# Patient Record
Sex: FEMALE | Race: WHITE | Employment: FULL TIME | URBAN - METROPOLITAN AREA
[De-identification: names, ages, dates, MRNs, and addresses within clinical notes are randomized per-mention and may not be internally consistent; named-entity substitution may affect disease eponyms.]

---

## 2017-08-04 ENCOUNTER — OFFICE VISIT (OUTPATIENT)
Dept: URGENT CARE | Facility: URGENT CARE | Age: 52
End: 2017-08-04
Payer: COMMERCIAL

## 2017-08-04 VITALS
HEIGHT: 62 IN | RESPIRATION RATE: 14 BRPM | OXYGEN SATURATION: 96 % | TEMPERATURE: 97.7 F | BODY MASS INDEX: 34.96 KG/M2 | SYSTOLIC BLOOD PRESSURE: 118 MMHG | HEART RATE: 57 BPM | WEIGHT: 190 LBS | DIASTOLIC BLOOD PRESSURE: 72 MMHG

## 2017-08-04 DIAGNOSIS — B07.9 SUBUNGUAL WART: ICD-10-CM

## 2017-08-04 DIAGNOSIS — Z23 NEED FOR VACCINATION: ICD-10-CM

## 2017-08-04 DIAGNOSIS — L03.011 PARONYCHIA OF RIGHT RING FINGER: Primary | ICD-10-CM

## 2017-08-04 PROCEDURE — 87186 SC STD MICRODIL/AGAR DIL: CPT | Performed by: PHYSICIAN ASSISTANT

## 2017-08-04 PROCEDURE — 87070 CULTURE OTHR SPECIMN AEROBIC: CPT | Performed by: PHYSICIAN ASSISTANT

## 2017-08-04 PROCEDURE — 90715 TDAP VACCINE 7 YRS/> IM: CPT | Performed by: PHYSICIAN ASSISTANT

## 2017-08-04 PROCEDURE — 90471 IMMUNIZATION ADMIN: CPT | Performed by: PHYSICIAN ASSISTANT

## 2017-08-04 PROCEDURE — 99203 OFFICE O/P NEW LOW 30 MIN: CPT | Mod: 25 | Performed by: PHYSICIAN ASSISTANT

## 2017-08-04 PROCEDURE — 87077 CULTURE AEROBIC IDENTIFY: CPT | Performed by: PHYSICIAN ASSISTANT

## 2017-08-04 RX ORDER — TRAMADOL HYDROCHLORIDE 50 MG/1
50 TABLET ORAL EVERY 6 HOURS PRN
Qty: 12 TABLET | Refills: 0 | Status: SHIPPED | OUTPATIENT
Start: 2017-08-04 | End: 2017-08-07

## 2017-08-04 RX ORDER — CEPHALEXIN 500 MG/1
500 CAPSULE ORAL 4 TIMES DAILY
Qty: 40 CAPSULE | Refills: 0 | Status: SHIPPED | OUTPATIENT
Start: 2017-08-04

## 2017-08-04 NOTE — MR AVS SNAPSHOT
After Visit Summary   8/4/2017    Yuko Giron    MRN: [id number]           Patient Information     Date Of Birth          1965        Visit Information        Provider Department      8/4/2017 8:35 PM Titi Cuellar PA-C [facility]        Today's Diagnoses     Paronychia of right ring finger    -  1    Subungual wart        Need for vaccination          Care Instructions      Paronychia of the Finger or Toe  Paronychia is an infection near a fingernail or toenail. It usually occurs when an opening in the cuticle or an ingrown toenail lets bacteria under the skin.  The infection will need to be drained if pus is present. If the infection has been caught early, you may need only antibiotic treatment. Healing will take about 1 to 2 weeks.  Home care  Follow these guidelines when caring for yourself at home:    Clean and soak the toe or finger. Do this 2 times a day for the first 3 days. To do so:    Soak your foot or hand in a tub of warm water for 5 minutes. Or hold your toe or finger under a faucet of warm running water for 5 minutes.    Clean any crust away with soap and water using a cotton swab.    Put antibiotic ointment on the infected area.    Change the dressing daily or any time it gets dirty.    If you were given antibiotics, take them as directed until they are all gone.    If your infection is on a toe, wear comfortable shoes with a lot of toe room. You can also wear open-toed sandals while your toe heals.    You may use over-the-counter medicine (acetaminophen or ibuprofen to help with pain, unless another medicine was prescribed. If you have chronic liver or kidney disease, talk with your healthcare provider before using these medicines. Also talk with your provider if you've had a stomach ulcer or GI (gastrointestinal) bleeding.  Prevention  The following can prevent paronychia:    Avoid cutting or playing with your cuticles at home.    Don't bite your  "nails.    Don't suck on your thumbs or fingers.  Follow-up care  Follow up with your healthcare provider, or as advised.  When to seek medical advice  Call your healthcare provider right away if any of these occur:    Redness, pain, or swelling of the finger or toe gets worse    Red streaks in the skin leading away from the wound    Pus or fluid draining from the nail area    Fever of 100.4 F (38 C) or higher, or as directed by your provider  Date Last Reviewed: 8/1/2016 2000-2017 [employer] [address] [place] [address]. All rights reserved. This information is not intended as a substitute for professional medical care. Always follow your healthcare professional's instructions.                Follow-ups after your visit        Who to contact     If you have questions or need follow up information about today's clinic visit or your schedule please contact [facility] directly at [phone].  Normal or non-critical lab and imaging results will be communicated to you by SuccessTSMhart, letter or phone within 4 business days after the clinic has received the results. If you do not hear from us within 7 days, please contact the clinic through SuccessTSMhart or phone. If you have a critical or abnormal lab result, we will notify you by phone as soon as possible.  Submit refill requests through Planet8 or call your pharmacy and they will forward the refill request to us. Please allow 3 business days for your refill to be completed.          Additional Information About Your Visit        Planet8 Information     Planet8 lets you send messages to your doctor, view your test results, renew your prescriptions, schedule appointments and more. To sign up, go to [email or web address] . Click on \"Log in\" on the left side of the screen, which will take you to the Welcome page. Then click on \"Sign up Now\" on the right side of the page.     You will be asked to enter the access code listed " "below, as well as some personal information. Please follow the directions to create your username and password.     Your access code is: [email or web address]  Expires: 2017  9:16 PM     Your access code will  in 90 days. If you need help or a new code, please call your [facility] clinic or [phone].        Care EveryWhere ID     This is your Care EveryWhere ID. This could be used by other organizations to access your [facility] medical records  [id number]        Your Vitals Were     Pulse Temperature Respirations Height Last Period Pulse Oximetry    57 97.7  F (36.5  C) (Oral) 14 5' 2\" (1.575 m) 2011 96%    BMI (Body Mass Index)                   34.75 kg/m2            Blood Pressure from Last 3 Encounters:   17 118/72   11 160/94    Weight from Last 3 Encounters:   17 190 lb (86.2 kg)   11 204 lb 9.6 oz (92.8 kg)              We Performed the Following     ADMIN 1st VACCINE     TDAP VACCINE (ADACEL)     Wound Culture Aerobic Bacterial          Today's Medication Changes          These changes are accurate as of: 17  9:16 PM.  If you have any questions, ask your nurse or doctor.               Start taking these medicines.        Dose/Directions    cephALEXin 500 MG capsule   Commonly known as:  KEFLEX   Used for:  Paronychia of right ring finger   Started by:  Titi Cuellar PA-C        Dose:  500 mg   Take 1 capsule (500 mg) by mouth 4 times daily   Quantity:  40 capsule   Refills:  0       traMADol 50 MG tablet   Commonly known as:  ULTRAM   Used for:  Paronychia of right ring finger   Started by:  Titi Cuellar PA-C        Dose:  50 mg   Take 1 tablet (50 mg) by mouth every 6 hours as needed for moderate pain   Quantity:  12 tablet   Refills:  0         Stop taking these medicines if you haven't already. Please contact your care team if you have questions.     hydrochlorothiazide 12.5 MG capsule   Commonly known as:  MICROZIDE   Stopped by:  Titi Cuellar PA-C           " simvastatin 20 MG tablet   Commonly known as:  ZOCOR   Stopped by:  Titi Cuellar PA-C                Where to get your medicines      These medications were sent to [facility]/pharmacy #[id number] - [place], [place] - [address] AT [facility]  [address] [place] [address]    Hours:  24-hours Phone:  [phone]     cephALEXin 500 MG capsule         Some of these will need a paper prescription and others can be bought over the counter.  Ask your nurse if you have questions.     Bring a paper prescription for each of these medications     traMADol 50 MG tablet                Primary Care Provider Office Phone # Fax #    Cristian Sampson [phone]       [employer] [facility] [address] [facility] [address]        Equal Access to Services     TONE SWEENEY AH: Lucio coronel hadasho Soomaali, waaxda luqadaha, qaybta kaalmada adeegyada, mary tilley . So [facility] [phone].    ATENCIÓN: Si habla español, tiene a morales disposición servicios gratuitos de asistencia lingüística. [facility] [phone].    We comply with applicable federal civil rights laws and [place] laws. We do not discriminate on the basis of race, color, national origin, age, disability sex, sexual orientation or gender identity.            Thank you!     Thank you for choosing [facility]  for your care. Our goal is always to provide you with excellent care. Hearing back from our patients is one way we can continue to improve our services. Please take a few minutes to complete the written survey that you may receive in the mail after your visit with us. Thank you!             Your Updated Medication List - Protect others around you: Learn how to safely use, store and throw away your medicines at www.disposemymeds.org.          This list is accurate as of: 8/4/17  9:16 PM.  Always use your most recent med list.                   Brand Name Dispense Instructions  for use Diagnosis    cephALEXin 500 MG capsule    KEFLEX    40 capsule    Take 1 capsule (500 mg) by mouth 4 times daily    Paronychia of right ring finger       co-enzyme Q-10 100 MG Caps capsule      Take  by mouth daily.        DAILY MULTIVITAMIN PO      Take  by mouth daily.        KEPPRA  MG 24 hr tablet   Generic drug:  levETIRAcetam      Take 500 mg by mouth 2 times daily.        LIPITOR PO           LOSARTAN POTASSIUM PO           metoprolol 25 MG 24 hr tablet    TOPROL-XL     Take 50 mg by mouth daily        SPIRONOLACTONE PO           traMADol 50 MG tablet    ULTRAM    12 tablet    Take 1 tablet (50 mg) by mouth every 6 hours as needed for moderate pain    Paronychia of right ring finger       VERAPAMIL HCL PO           vitamin D 1000 UNITS capsule      Take 1 capsule by mouth daily.

## 2017-08-05 NOTE — NURSING NOTE
"Chief Complaint   Patient presents with     Urgent Care     Derm Problem     infected right 4th finger on right hand, started last night. Filed her nails last night which she thinks caused it.        Initial /72  Pulse 57  Temp 97.7  F (36.5  C) (Oral)  Resp 14  Ht 5' 2\" (1.575 m)  Wt 190 lb (86.2 kg)  LMP 07/05/2011  SpO2 96%  BMI 34.75 kg/m2 Estimated body mass index is 34.75 kg/(m^2) as calculated from the following:    Height as of this encounter: 5' 2\" (1.575 m).    Weight as of this encounter: 190 lb (86.2 kg).  Medication Reconciliation: complete  "

## 2017-08-05 NOTE — PROGRESS NOTES
SUBJECTIVE:  Chief Complaint   Patient presents with     Urgent Care     Derm Problem     infected right 4th finger on right hand, started last night. Filed her nails last night which she thinks caused it.      Yuko Giron is a 52 year old female who presents with a chief complaint of right right finger pain, swelling and tenderness.  Symptoms began 2 day(s) ago, are moderately severe and sudden onset, still present and constant  Context:  Injury:Yes: Patient was using a finger nail file to remove a wart on the ulnar side of the nail.  Injury happened while at home and while trying to reduce a wart with a file. How: filing the area with a file immediate pain, delayed swelling.   Pain exacerbated by holding hand in dependent position Relieved by elevation.  She treated it initially with no therapy. This is the first time this type of injury has occurred to this patient.     No past medical history on file.  Current Outpatient Prescriptions   Medication Sig Dispense Refill     Atorvastatin Calcium (LIPITOR PO)        LOSARTAN POTASSIUM PO        SPIRONOLACTONE PO        VERAPAMIL HCL PO        cephALEXin (KEFLEX) 500 MG capsule Take 1 capsule (500 mg) by mouth 4 times daily 40 capsule 0     traMADol (ULTRAM) 50 MG tablet Take 1 tablet (50 mg) by mouth every 6 hours as needed for moderate pain 12 tablet 0     co-enzyme Q-10 (CO Q 10) 100 MG CAPS Take  by mouth daily.       levetiracetam (KEPPRA XR) 500 MG 24 hr tablet Take 500 mg by mouth 2 times daily.       metoprolol (TOPROL-XL) 25 MG 24 hr tablet Take 50 mg by mouth daily        Multiple Vitamin (DAILY MULTIVITAMIN PO) Take  by mouth daily.       Cholecalciferol (VITAMIN D) 1000 UNIT capsule Take 1 capsule by mouth daily.       Social History   Substance Use Topics     Smoking status: Former Smoker     Smokeless tobacco: Never Used     Alcohol use Not on file       ROS:  Review of systems negative except as stated below    EXAM:   /72  Pulse 57  Temp  "97.7  F (36.5  C) (Oral)  Resp 14  Ht 5' 2\" (1.575 m)  Wt 190 lb (86.2 kg)  LMP 07/05/2011  SpO2 96%  BMI 34.75 kg/m2  M/S Exam: right hand ring finger on ulnar side has discharge from the ulnar side a culture is taken  erythema, swelling, tenderness to palpation and at the ulnar side is consistent with a paronychia  There is a subungual wart  The volar finger pad is swollen and erythematous and may be an early felon.  It is not amenable to ID for a felon at this time  GENERAL APPEARANCE: healthy, alert and no distress  EXTREMITIES: peripheral pulses normal  SKIN: no suspicious lesions or rashes  NEURO: Normal strength and tone, sensory exam grossly normal, mentation intact and speech normal    X-RAY was not done.    ASSESSMENT:     Encounter Diagnoses   Name Primary?     Need for vaccination Yes     Paronychia of right ring finger        PLAN:  Warm soaks on finger  Keep clean dry and protected  Antibiotic as written  Follow up with hand surgeon if not improved in 48 hours  Indication for emergent return to ER gone over.  Follow up with dermatology for definitive treatment of the subungual wart    1. Paronychia of right ring finger    - cephALEXin (KEFLEX) 500 MG capsule; Take 1 capsule (500 mg) by mouth 4 times daily  Dispense: 40 capsule; Refill: 0  - traMADol (ULTRAM) 50 MG tablet; Take 1 tablet (50 mg) by mouth every 6 hours as needed for moderate pain  Dispense: 12 tablet; Refill: 0  - Wound Culture Aerobic Bacterial    - TDAP VACCINE (ADACEL)  - ADMIN 1st VACCINE      "

## 2017-08-05 NOTE — PATIENT INSTRUCTIONS
Paronychia of the Finger or Toe  Paronychia is an infection near a fingernail or toenail. It usually occurs when an opening in the cuticle or an ingrown toenail lets bacteria under the skin.  The infection will need to be drained if pus is present. If the infection has been caught early, you may need only antibiotic treatment. Healing will take about 1 to 2 weeks.  Home care  Follow these guidelines when caring for yourself at home:    Clean and soak the toe or finger. Do this 2 times a day for the first 3 days. To do so:    Soak your foot or hand in a tub of warm water for 5 minutes. Or hold your toe or finger under a faucet of warm running water for 5 minutes.    Clean any crust away with soap and water using a cotton swab.    Put antibiotic ointment on the infected area.    Change the dressing daily or any time it gets dirty.    If you were given antibiotics, take them as directed until they are all gone.    If your infection is on a toe, wear comfortable shoes with a lot of toe room. You can also wear open-toed sandals while your toe heals.    You may use over-the-counter medicine (acetaminophen or ibuprofen to help with pain, unless another medicine was prescribed. If you have chronic liver or kidney disease, talk with your healthcare provider before using these medicines. Also talk with your provider if you've had a stomach ulcer or GI (gastrointestinal) bleeding.  Prevention  The following can prevent paronychia:    Avoid cutting or playing with your cuticles at home.    Don't bite your nails.    Don't suck on your thumbs or fingers.  Follow-up care  Follow up with your healthcare provider, or as advised.  When to seek medical advice  Call your healthcare provider right away if any of these occur:    Redness, pain, or swelling of the finger or toe gets worse    Red streaks in the skin leading away from the wound    Pus or fluid draining from the nail area    Fever of 100.4 F (38 C) or higher, or as directed  by your provider  Date Last Reviewed: 8/1/2016 2000-2017 [employer]. [address] [place] [address], [place] [address]. All rights reserved. This information is not intended as a substitute for professional medical care. Always follow your healthcare professional's instructions.

## 2017-08-08 LAB
BACTERIA SPEC CULT: ABNORMAL
MICRO REPORT STATUS: ABNORMAL
MICROORGANISM SPEC CULT: ABNORMAL
SPECIMEN SOURCE: ABNORMAL

## 2021-10-22 NOTE — PROGRESS NOTES
[facility] Initial Evaluation    Subjective:  Yuko Giron is a 56 year old female with a cervical condition. Mechanism of injury: Chronic intermittent left neck and shoulder since 2016. Most recent onset starting about 3 months ago for unknown reasons. Where: (home, work, MVA, community, recreation/sport, unknown, other): NA. Onset of symptoms: July 2021, PT order date: October 2021. Location of symptoms: left neck, let upper trap, left shoulder blade, thoracic spine, left shoulder, left upper arm, left forearm, left digits 1-3. Pain level on number scale: 4-7/10. Quality of pain: compression, tightness. Associated symptoms: numbness, tingling. Pain frequency (constant/intermittent): constant. Symptoms are exacerbated by: prolonged standing, prolonged sitting, sleeping, computer work, driving, cooking. Symptoms are relieved by: heat. Progression of symptoms since onset (same/better/worse): worse. Special tests (x-ray, MRI, CT scan, EMG, bone scan): none recently. Previous treatment: None. Improvement with previous treatment: NA. General health as reported by patient is fair. Pertinent medical history includes:  see Epic. Medical allergies includes: see Epic. Surgical history includes: see Epic. Current medications include: see Epic. 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 Patient is (working in normal job without restrictions/working in normal job with restrictions/working in an alternate job/not working due to present treatment problem): working in normal job. Primary job tasks: computer work, prolonged sitting. Barriers at home/work: None reported by patient. Red flags: None reported by patient.    Objective  Posture     Sitting (good/fair/poor):  poor  Standing (good/fair/poor):  fair  Protruded head (yes/no):  no  Wry neck (right/left/nil):  nil  Relevant wry neck (yes/no):  no  Correction of posture (better/worse/no effect): better    Neurological    Myotomes L R   C4  (shoulder elevation) 5/5 5/5   C5 (shoulder abduction) 5/5 5/5   C6 (elbow flexion) 5/5 5/5   C7 (elbow extension) 5/5 5/5   C8 (thumb extension) 5/5 5/5   T1 (finger add/abd) 5/5 5/5     Sensory Deficit, Reflexes, Dural Signs: cervical dermatomes WNL    Cervical Movement Loss Matt Mod Min Nil Pain   Protrusion    x +   Flexion   x  +   Retraction   x  +   Extension   x  +   Rotation Left   x  +   Rotation Right   x  +   Lateral Flexion Right   x  +   Lateral Flexion Left   x  +     Assessment/Plan:    Patient is a 56 year old female with cervical complaints.    Patient has the following significant findings with corresponding treatment plan.                Diagnosis 1:  Neck pain with left radiculopathy  Pain -  hot/cold therapy, manual therapy, self management, education, directional preference exercise and home program  Decreased ROM/flexibility - manual therapy and therapeutic exercise  Decreased joint mobility - manual therapy and therapeutic exercise  Decreased strength - therapeutic exercise and therapeutic activities  Impaired muscle performance - neuro re-education and home program  Decreased function - therapeutic activities  Impaired posture - neuro re-education    Previous and current functional limitations:  (See Goal Flow Sheet for this information)    Short term and Long term goals: (See Goal Flow Sheet for this information)     Communication ability:  Patient appears to be able to clearly communicate and understand verbal and written communication and follow directions correctly.  Treatment Explanation - The following has been discussed with the patient:   RX ordered/plan of care  Anticipated outcomes  Possible risks and side effects  This patient would benefit from PT intervention to resume normal activities.   Rehab potential is good.    Frequency:  1 X week, once daily  Duration:  for 4 weeks tapering to 2 X a month over 1 month  Discharge Plan:  Achieve all LTG.  Independent in home treatment  program.  Reach maximal therapeutic benefit.    Please refer to the daily flowsheet for treatment today, total treatment time and time spent performing 1:1 timed codes.

## 2021-10-25 ENCOUNTER — THERAPY VISIT (OUTPATIENT)
Dept: PHYSICAL THERAPY | Facility: CLINIC | Age: 56
End: 2021-10-25
Payer: COMMERCIAL

## 2021-10-25 DIAGNOSIS — M54.2 NECK PAIN: ICD-10-CM

## 2021-10-25 PROCEDURE — 97530 THERAPEUTIC ACTIVITIES: CPT | Mod: GP | Performed by: PHYSICAL THERAPIST

## 2021-10-25 PROCEDURE — 97110 THERAPEUTIC EXERCISES: CPT | Mod: GP | Performed by: PHYSICAL THERAPIST

## 2021-10-25 PROCEDURE — 97161 PT EVAL LOW COMPLEX 20 MIN: CPT | Mod: GP | Performed by: PHYSICAL THERAPIST

## 2021-10-25 NOTE — LETTER
[facility]  [address] [place] [address]  [phone]    2021    Re: Yuko Giron   :   1965  MRN:  [id number]   REFERRING PHYSICIAN:   Maki [facility]  Date of Initial Evaluation:  10/25/21  Visits:  Rxs Used: 1  Reason for Referral:  Neck pain    EVALUATION SUMMARY    [facility] Initial Evaluation    Subjective:  Yuko Giron is a 56 year old female with a cervical condition. Mechanism of injury: Chronic intermittent left neck and shoulder since 2016. Most recent onset starting about 3 months ago for unknown reasons. Where: (home, work, MVA, community, recreation/sport, unknown, other): NA. Onset of symptoms: 2021, PT order date: 2021. Location of symptoms: left neck, let upper trap, left shoulder blade, thoracic spine, left shoulder, left upper arm, left forearm, left digits 1-3. Pain level on number scale: 4-7/10. Quality of pain: compression, tightness. Associated symptoms: numbness, tingling. Pain frequency (constant/intermittent): constant. Symptoms are exacerbated by: prolonged standing, prolonged sitting, sleeping, computer work, driving, cooking. Symptoms are relieved by: heat. Progression of symptoms since onset (same/better/worse): worse. Special tests (x-ray, MRI, CT scan, EMG, bone scan): none recently. Previous treatment: None. Improvement with previous treatment: NA. General health as reported by patient is fair. Pertinent medical history includes:  see Epic. Medical allergies includes: see Epic. Surgical history includes: see Epic. Current medications include: see Epic. 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 Patient is (working in normal job without restrictions/working in normal job with restrictions/working in an alternate job/not working due to present treatment problem): working in normal job. Primary job  tasks: computer work, prolonged sitting. Barriers at home/work: None reported by patient. Red flags: None reported by patient.    Objective  Posture     Sitting (good/fair/poor):  poor  Standing (good/fair/poor):  fair  Protruded head (yes/no):  no  Wry neck (right/left/nil):  nil  Relevant wry neck (yes/no):  no  Correction of posture (better/worse/no effect): better    Neurological    Re: Yuko L Dekristieirst   :   1965    Myotomes L R   C4 (shoulder elevation)    C5 (shoulder abduction)    C6 (elbow flexion)    C7 (elbow extension)    C8 (thumb extension)    T1 (finger add/abd)      Sensory Deficit, Reflexes, Dural Signs: cervical dermatomes WNL    Cervical Movement Loss Matt Mod Min Nil Pain   Protrusion    x +   Flexion   x  +   Retraction   x  +   Extension   x  +   Rotation Left   x  +   Rotation Right   x  +   Lateral Flexion Right   x  +   Lateral Flexion Left   x  +     Assessment/Plan:    Patient is a 56 year old female with cervical complaints.    Patient has the following significant findings with corresponding treatment plan.                Diagnosis 1:  Neck pain with left radiculopathy  Pain -  hot/cold therapy, manual therapy, self management, education, directional preference exercise and home program  Decreased ROM/flexibility - manual therapy and therapeutic exercise  Decreased joint mobility - manual therapy and therapeutic exercise  Decreased strength - therapeutic exercise and therapeutic activities  Impaired muscle performance - neuro re-education and home program  Decreased function - therapeutic activities  Impaired posture - neuro re-education    Previous and current functional limitations:  (See Goal Flow Sheet for this information)    Short term and Long term goals: (See Goal Flow Sheet for this information)     Communication ability:  Patient appears to be able to clearly communicate and understand verbal and written communication and follow  directions correctly.  Treatment Explanation - The following has been discussed with the patient:   RX ordered/plan of care  Anticipated outcomes  Possible risks and side effects  This patient would benefit from PT intervention to resume normal activities.   Rehab potential is good.    Frequency:  1 X week, once daily  Duration:  for 4 weeks tapering to 2 X a month over 1 month  Discharge Plan:  Achieve all LTG.  Independent in home treatment program.  Reach maximal therapeutic benefit.  Re: Yuko Giron   :   1965    Thank you for your referral.    INQUIRIES  Therapist: Rizwan Nguyen DPT, Cert. MDT  [facility] [address] [place] [address]  Phone: [phone]  Fax: [phone]

## 2021-11-01 ENCOUNTER — THERAPY VISIT (OUTPATIENT)
Dept: PHYSICAL THERAPY | Facility: CLINIC | Age: 56
End: 2021-11-01
Payer: COMMERCIAL

## 2021-11-01 DIAGNOSIS — M54.2 NECK PAIN: ICD-10-CM

## 2021-11-01 PROCEDURE — 97530 THERAPEUTIC ACTIVITIES: CPT | Mod: GP | Performed by: PHYSICAL THERAPIST

## 2021-11-01 PROCEDURE — 97112 NEUROMUSCULAR REEDUCATION: CPT | Mod: GP | Performed by: PHYSICAL THERAPIST

## 2021-11-01 PROCEDURE — 97110 THERAPEUTIC EXERCISES: CPT | Mod: GP | Performed by: PHYSICAL THERAPIST

## 2021-11-08 ENCOUNTER — THERAPY VISIT (OUTPATIENT)
Dept: PHYSICAL THERAPY | Facility: CLINIC | Age: 56
End: 2021-11-08
Payer: COMMERCIAL

## 2021-11-08 DIAGNOSIS — M54.2 NECK PAIN: ICD-10-CM

## 2021-11-08 PROCEDURE — 97110 THERAPEUTIC EXERCISES: CPT | Mod: GP | Performed by: PHYSICAL THERAPIST

## 2021-11-08 PROCEDURE — 97140 MANUAL THERAPY 1/> REGIONS: CPT | Mod: GP | Performed by: PHYSICAL THERAPIST

## 2021-11-08 PROCEDURE — 97112 NEUROMUSCULAR REEDUCATION: CPT | Mod: GP | Performed by: PHYSICAL THERAPIST

## 2021-12-31 PROBLEM — M54.2 NECK PAIN: Status: RESOLVED | Noted: 2021-10-25 | Resolved: 2021-12-31

## 2021-12-31 NOTE — PROGRESS NOTES
Discharge Note    Progress reporting period is from initial evaluation date (please see noted date below) to Nov 8, 2021.  Linked Episodes   Type: Episode: Status: Noted: Resolved: Last update: Updated by:   PHYSICAL THERAPY neck and left shoulder Active 10/25/2021  11/8/2021  5:19 PM Rizwan Nguyen PT      Comments:       Yuko failed to follow up and current status is unknown.  Please see information below for last relevant information on current status.  Patient seen for 3 visits.    SUBJECTIVE  Subjective changes noted by patient:  Patient reports she has not been as good with exercises this past week but has been paying more attention to posture. Patient states most of her pain is located in lower central cervical spine and left upper trap region. Not much pain down left UE into forearm and hand.  .  Current pain level is 1/10.     Previous pain level was  7/10.   Changes in function:  Yes (See Goal flowsheet attached for changes in current functional level)  Adverse reaction to treatment or activity: None    OBJECTIVE  Changes noted in objective findings: cervical AROM rotation bilaterally = min loss - improved right rotation WNL after thoracic EXT stretch; weak scapular stabilizers; hypomobile thoracic spine     ASSESSMENT/PLAN  Diagnosis: Neck pain with left radiculopathy   Updated problem list and treatment plan:     STG/LTGs have been met or progress has been made towards goals:  Yes, please see goal flowsheet for most current information  Assessment of Progress: current status is unknown.    Last current status: Pt is progressing as expected   Self Management Plans:  HEP  I have re-evaluated this patient and find that the nature, scope, duration and intensity of the therapy is appropriate for the medical condition of the patient.  Yuko continues to require the following intervention to meet STG and LTG's:  HEP.    Recommendations:  Discharge with current home program.  Patient to follow up with MD  as needed.    Please refer to the daily flowsheet for treatment today, total treatment time and time spent performing 1:1 timed codes.